# Patient Record
Sex: FEMALE | Race: WHITE | URBAN - METROPOLITAN AREA
[De-identification: names, ages, dates, MRNs, and addresses within clinical notes are randomized per-mention and may not be internally consistent; named-entity substitution may affect disease eponyms.]

---

## 2021-08-09 ENCOUNTER — APPOINTMENT (RX ONLY)
Dept: URBAN - METROPOLITAN AREA CLINIC 93 | Facility: CLINIC | Age: 46
Setting detail: DERMATOLOGY
End: 2021-08-09

## 2021-08-09 DIAGNOSIS — B07.8 OTHER VIRAL WARTS: ICD-10-CM | Status: INADEQUATELY CONTROLLED

## 2021-08-09 PROCEDURE — 17110 DESTRUCTION B9 LES UP TO 14: CPT

## 2021-08-09 PROCEDURE — ? COUNSELING

## 2021-08-09 PROCEDURE — ? LIQUID NITROGEN

## 2021-08-09 PROCEDURE — ? FULL BODY SKIN EXAM - DECLINED

## 2021-08-09 ASSESSMENT — LOCATION DETAILED DESCRIPTION DERM
LOCATION DETAILED: RIGHT LATERAL PLANTAR 3RD TOE
LOCATION DETAILED: RIGHT PLANTAR FOREFOOT OVERLYING 2ND METATARSAL

## 2021-08-09 ASSESSMENT — LOCATION SIMPLE DESCRIPTION DERM
LOCATION SIMPLE: RIGHT PLANTAR SURFACE
LOCATION SIMPLE: PLANTAR SURFACE OF RIGHT 3RD TOE

## 2021-08-09 ASSESSMENT — LOCATION ZONE DERM
LOCATION ZONE: TOE
LOCATION ZONE: FEET

## 2021-08-09 NOTE — PROCEDURE: LIQUID NITROGEN
Render Post-Care Instructions In Note?: no
Show Topical Anesthesia Variable?: Yes
Post-Care Instructions: I reviewed with the patient in detail post-care instructions. Patient is to wear sunprotection, and avoid picking at any of the treated lesions. Pt may apply Vaseline to crusted or scabbing areas.
Number Of Freeze-Thaw Cycles: 3 freeze-thaw cycles
Medical Necessity Clause: This procedure was medically necessary because the lesions that were treated were:
Consent: The patient's consent was obtained including but not limited to risks of crusting, scabbing, blistering, scarring, darker or lighter pigmentary change, recurrence, incomplete removal and infection.
Detail Level: Detailed
Medical Necessity Information: It is in your best interest to select a reason for this procedure from the list below. All of these items fulfill various CMS LCD requirements except the new and changing color options.
Duration Of Freeze Thaw-Cycle (Seconds): 4

## 2021-11-01 ENCOUNTER — APPOINTMENT (RX ONLY)
Dept: URBAN - METROPOLITAN AREA CLINIC 93 | Facility: CLINIC | Age: 46
Setting detail: DERMATOLOGY
End: 2021-11-01

## 2021-11-01 DIAGNOSIS — B07.8 OTHER VIRAL WARTS: ICD-10-CM

## 2021-11-01 PROCEDURE — ? LIQUID NITROGEN

## 2021-11-01 PROCEDURE — 17110 DESTRUCTION B9 LES UP TO 14: CPT

## 2021-11-01 PROCEDURE — ? COUNSELING

## 2021-11-01 PROCEDURE — ? FULL BODY SKIN EXAM - DECLINED

## 2021-11-01 ASSESSMENT — LOCATION SIMPLE DESCRIPTION DERM: LOCATION SIMPLE: RIGHT PLANTAR SURFACE

## 2021-11-01 ASSESSMENT — LOCATION ZONE DERM: LOCATION ZONE: FEET

## 2021-11-01 ASSESSMENT — LOCATION DETAILED DESCRIPTION DERM: LOCATION DETAILED: RIGHT PLANTAR FOREFOOT OVERLYING 2ND METATARSAL

## 2021-11-22 ENCOUNTER — APPOINTMENT (RX ONLY)
Dept: URBAN - METROPOLITAN AREA CLINIC 93 | Facility: CLINIC | Age: 46
Setting detail: DERMATOLOGY
End: 2021-11-22

## 2021-11-22 DIAGNOSIS — B07.8 OTHER VIRAL WARTS: ICD-10-CM

## 2021-11-22 PROCEDURE — 17110 DESTRUCTION B9 LES UP TO 14: CPT

## 2021-11-22 PROCEDURE — ? LIQUID NITROGEN

## 2021-11-22 PROCEDURE — ? COUNSELING

## 2021-11-22 PROCEDURE — ? FULL BODY SKIN EXAM - DECLINED

## 2021-11-22 ASSESSMENT — LOCATION ZONE DERM: LOCATION ZONE: FEET

## 2021-11-22 ASSESSMENT — LOCATION SIMPLE DESCRIPTION DERM: LOCATION SIMPLE: RIGHT PLANTAR SURFACE

## 2021-11-22 ASSESSMENT — LOCATION DETAILED DESCRIPTION DERM: LOCATION DETAILED: RIGHT PLANTAR FOREFOOT OVERLYING 2ND METATARSAL

## 2021-12-13 ENCOUNTER — APPOINTMENT (RX ONLY)
Dept: URBAN - METROPOLITAN AREA CLINIC 93 | Facility: CLINIC | Age: 46
Setting detail: DERMATOLOGY
End: 2021-12-13

## 2021-12-13 DIAGNOSIS — L21.8 OTHER SEBORRHEIC DERMATITIS: ICD-10-CM | Status: INADEQUATELY CONTROLLED

## 2021-12-13 DIAGNOSIS — B07.8 OTHER VIRAL WARTS: ICD-10-CM

## 2021-12-13 DIAGNOSIS — L65.0 TELOGEN EFFLUVIUM: ICD-10-CM

## 2021-12-13 PROCEDURE — 99213 OFFICE O/P EST LOW 20 MIN: CPT | Mod: 25

## 2021-12-13 PROCEDURE — ? COUNSELING

## 2021-12-13 PROCEDURE — ? LIQUID NITROGEN

## 2021-12-13 PROCEDURE — ? FULL BODY SKIN EXAM - DECLINED

## 2021-12-13 PROCEDURE — ? PRESCRIPTION

## 2021-12-13 PROCEDURE — ? ADDITIONAL NOTES

## 2021-12-13 PROCEDURE — 17110 DESTRUCTION B9 LES UP TO 14: CPT

## 2021-12-13 RX ORDER — KETOCONAZOLE 20 MG/G
CREAM TOPICAL BID
Qty: 30 | Refills: 2 | Status: ERX | COMMUNITY
Start: 2021-12-13

## 2021-12-13 RX ORDER — HYDROCORTISONE 25 MG/G
CREAM TOPICAL
Qty: 30 | Refills: 2 | Status: ERX | COMMUNITY
Start: 2021-12-13

## 2021-12-13 RX ADMIN — HYDROCORTISONE: 25 CREAM TOPICAL at 00:00

## 2021-12-13 RX ADMIN — KETOCONAZOLE: 20 CREAM TOPICAL at 00:00

## 2021-12-13 ASSESSMENT — LOCATION DETAILED DESCRIPTION DERM
LOCATION DETAILED: RIGHT PLANTAR FOREFOOT OVERLYING 4TH METATARSAL
LOCATION DETAILED: RIGHT SUPERIOR MEDIAL FOREHEAD
LOCATION DETAILED: RIGHT CYMBA CONCHA
LOCATION DETAILED: RIGHT PLANTAR FOREFOOT OVERLYING 3RD METATARSAL
LOCATION DETAILED: LEFT CYMBA CONCHA

## 2021-12-13 ASSESSMENT — LOCATION SIMPLE DESCRIPTION DERM
LOCATION SIMPLE: LEFT EAR
LOCATION SIMPLE: RIGHT EAR
LOCATION SIMPLE: RIGHT PLANTAR SURFACE
LOCATION SIMPLE: RIGHT FOREHEAD

## 2021-12-13 ASSESSMENT — LOCATION ZONE DERM
LOCATION ZONE: FEET
LOCATION ZONE: EAR
LOCATION ZONE: FACE

## 2021-12-13 NOTE — PROCEDURE: LIQUID NITROGEN
Include Z78.9 (Other Specified Conditions Influencing Health Status) As An Associated Diagnosis?: No
Duration Of Freeze Thaw-Cycle (Seconds): 3
Number Of Freeze-Thaw Cycles: 3 freeze-thaw cycles
Consent: The patient's consent was obtained including but not limited to risks of crusting, scabbing, blistering, scarring, darker or lighter pigmentary change, recurrence, incomplete removal and infection.
Medical Necessity Information: It is in your best interest to select a reason for this procedure from the list below. All of these items fulfill various CMS LCD requirements except the new and changing color options.
Detail Level: Detailed
Show Applicator Variable?: Yes
Post-Care Instructions: I reviewed with the patient in detail post-care instructions. Patient is to wear sunprotection, and avoid picking at any of the treated lesions. Pt may apply Vaseline to crusted or scabbing areas.
Medical Necessity Clause: This procedure was medically necessary because the lesions that were treated were:

## 2021-12-13 NOTE — PROCEDURE: ADDITIONAL NOTES
Render Risk Assessment In Note?: no
Additional Notes: Patient had COVID in July and noticed diffuse hair shedding starting in September of this year.
Detail Level: Simple

## 2022-01-17 ENCOUNTER — APPOINTMENT (RX ONLY)
Dept: URBAN - METROPOLITAN AREA CLINIC 93 | Facility: CLINIC | Age: 47
Setting detail: DERMATOLOGY
End: 2022-01-17

## 2022-01-17 DIAGNOSIS — L21.8 OTHER SEBORRHEIC DERMATITIS: ICD-10-CM | Status: INADEQUATELY CONTROLLED

## 2022-01-17 DIAGNOSIS — L65.0 TELOGEN EFFLUVIUM: ICD-10-CM

## 2022-01-17 DIAGNOSIS — B07.8 OTHER VIRAL WARTS: ICD-10-CM

## 2022-01-17 PROCEDURE — ? FULL BODY SKIN EXAM - DECLINED

## 2022-01-17 PROCEDURE — ? LIQUID NITROGEN

## 2022-01-17 PROCEDURE — ? COUNSELING

## 2022-01-17 PROCEDURE — 17110 DESTRUCTION B9 LES UP TO 14: CPT

## 2022-01-17 PROCEDURE — 99214 OFFICE O/P EST MOD 30 MIN: CPT | Mod: 25

## 2022-01-17 PROCEDURE — ? PRESCRIPTION

## 2022-01-17 PROCEDURE — ? ADDITIONAL NOTES

## 2022-01-17 RX ORDER — IRON/C/FOLIC ACD/MV CMB11/CALC 151-200-1
TABLET ORAL QD
Qty: 30 | Refills: 4 | Status: ERX | COMMUNITY
Start: 2022-01-17

## 2022-01-17 RX ORDER — HYDROCORTISONE 25 MG/G
CREAM TOPICAL
Qty: 30 | Refills: 2 | Status: ERX

## 2022-01-17 RX ADMIN — Medication: at 00:00

## 2022-01-17 ASSESSMENT — LOCATION ZONE DERM
LOCATION ZONE: FEET
LOCATION ZONE: FACE
LOCATION ZONE: EAR

## 2022-01-17 ASSESSMENT — LOCATION DETAILED DESCRIPTION DERM
LOCATION DETAILED: LEFT CYMBA CONCHA
LOCATION DETAILED: RIGHT SUPERIOR MEDIAL FOREHEAD
LOCATION DETAILED: RIGHT CYMBA CONCHA
LOCATION DETAILED: RIGHT PLANTAR FOREFOOT OVERLYING 4TH METATARSAL
LOCATION DETAILED: RIGHT PLANTAR FOREFOOT OVERLYING 3RD METATARSAL

## 2022-01-17 ASSESSMENT — LOCATION SIMPLE DESCRIPTION DERM
LOCATION SIMPLE: LEFT EAR
LOCATION SIMPLE: RIGHT PLANTAR SURFACE
LOCATION SIMPLE: RIGHT EAR
LOCATION SIMPLE: RIGHT FOREHEAD

## 2022-01-17 NOTE — PROCEDURE: ADDITIONAL NOTES
Render Risk Assessment In Note?: no
Additional Notes: Patient had COVID in July and noticed diffuse hair shedding starting in September of this year. Labs were reviewed from her PCP and her  ferritin levels were low
Detail Level: Simple

## 2022-02-07 ENCOUNTER — APPOINTMENT (RX ONLY)
Dept: URBAN - METROPOLITAN AREA CLINIC 93 | Facility: CLINIC | Age: 47
Setting detail: DERMATOLOGY
End: 2022-02-07

## 2022-02-07 DIAGNOSIS — Z41.9 ENCOUNTER FOR PROCEDURE FOR PURPOSES OTHER THAN REMEDYING HEALTH STATE, UNSPECIFIED: ICD-10-CM

## 2022-02-07 DIAGNOSIS — B07.8 OTHER VIRAL WARTS: ICD-10-CM

## 2022-02-07 PROCEDURE — ? COUNSELING

## 2022-02-07 PROCEDURE — ? FULL BODY SKIN EXAM - DECLINED

## 2022-02-07 PROCEDURE — ? COSMETIC CONSULTATION: BOTOX

## 2022-02-07 PROCEDURE — ? ADDITIONAL NOTES

## 2022-02-07 PROCEDURE — ? LIQUID NITROGEN

## 2022-02-07 PROCEDURE — 17110 DESTRUCTION B9 LES UP TO 14: CPT

## 2022-02-07 ASSESSMENT — LOCATION SIMPLE DESCRIPTION DERM: LOCATION SIMPLE: RIGHT PLANTAR SURFACE

## 2022-02-07 ASSESSMENT — LOCATION DETAILED DESCRIPTION DERM
LOCATION DETAILED: RIGHT PLANTAR FOREFOOT OVERLYING 3RD METATARSAL
LOCATION DETAILED: RIGHT PLANTAR FOREFOOT OVERLYING 4TH METATARSAL

## 2022-02-07 ASSESSMENT — LOCATION ZONE DERM: LOCATION ZONE: FEET

## 2022-02-07 NOTE — PROCEDURE: ADDITIONAL NOTES
Detail Level: Simple
Additional Notes: Pt was quoted $156 for 12 units periocular
Render Risk Assessment In Note?: no

## 2022-03-10 ENCOUNTER — APPOINTMENT (RX ONLY)
Dept: URBAN - METROPOLITAN AREA CLINIC 93 | Facility: CLINIC | Age: 47
Setting detail: DERMATOLOGY
End: 2022-03-10

## 2022-03-10 DIAGNOSIS — Z41.9 ENCOUNTER FOR PROCEDURE FOR PURPOSES OTHER THAN REMEDYING HEALTH STATE, UNSPECIFIED: ICD-10-CM

## 2022-03-10 DIAGNOSIS — B07.8 OTHER VIRAL WARTS: ICD-10-CM

## 2022-03-10 PROCEDURE — ? COSMETIC CONSULTATION: FILLERS

## 2022-03-10 PROCEDURE — 17110 DESTRUCTION B9 LES UP TO 14: CPT

## 2022-03-10 PROCEDURE — ? LIQUID NITROGEN

## 2022-03-10 PROCEDURE — ? ADDITIONAL NOTES

## 2022-03-10 PROCEDURE — ? COSMETIC CONSULTATION: BOTOX

## 2022-03-10 PROCEDURE — ? COUNSELING

## 2022-03-10 PROCEDURE — ? FULL BODY SKIN EXAM - DECLINED

## 2022-03-10 ASSESSMENT — LOCATION DETAILED DESCRIPTION DERM
LOCATION DETAILED: RIGHT SUPERIOR MEDIAL BUCCAL CHEEK
LOCATION DETAILED: LEFT SUPERIOR CENTRAL BUCCAL CHEEK
LOCATION DETAILED: RIGHT PLANTAR FOREFOOT OVERLYING 1ST METATARSAL

## 2022-03-10 ASSESSMENT — LOCATION SIMPLE DESCRIPTION DERM
LOCATION SIMPLE: RIGHT PLANTAR SURFACE
LOCATION SIMPLE: RIGHT CHEEK
LOCATION SIMPLE: LEFT CHEEK

## 2022-03-10 ASSESSMENT — LOCATION ZONE DERM
LOCATION ZONE: FACE
LOCATION ZONE: FEET

## 2022-03-10 NOTE — PROCEDURE: ADDITIONAL NOTES
Additional Notes: Patient was counseled to have a consultation with Dr. Meza for [place]
Detail Level: Simple
Render Risk Assessment In Note?: no

## 2022-04-28 ENCOUNTER — APPOINTMENT (RX ONLY)
Dept: URBAN - METROPOLITAN AREA CLINIC 93 | Facility: CLINIC | Age: 47
Setting detail: DERMATOLOGY
End: 2022-04-28

## 2022-04-28 DIAGNOSIS — B07.8 OTHER VIRAL WARTS: ICD-10-CM

## 2022-04-28 PROCEDURE — ? LIQUID NITROGEN

## 2022-04-28 PROCEDURE — 17110 DESTRUCTION B9 LES UP TO 14: CPT

## 2022-04-28 PROCEDURE — ? FULL BODY SKIN EXAM - DECLINED

## 2022-04-28 PROCEDURE — ? COUNSELING

## 2022-04-28 ASSESSMENT — LOCATION ZONE DERM: LOCATION ZONE: FEET

## 2022-04-28 ASSESSMENT — LOCATION DETAILED DESCRIPTION DERM: LOCATION DETAILED: RIGHT PLANTAR FOREFOOT OVERLYING 1ST METATARSAL

## 2022-04-28 ASSESSMENT — LOCATION SIMPLE DESCRIPTION DERM: LOCATION SIMPLE: RIGHT PLANTAR SURFACE

## 2022-06-09 ENCOUNTER — APPOINTMENT (RX ONLY)
Dept: URBAN - METROPOLITAN AREA CLINIC 166 | Facility: CLINIC | Age: 47
Setting detail: DERMATOLOGY
End: 2022-06-09

## 2022-06-09 DIAGNOSIS — B07.8 OTHER VIRAL WARTS: ICD-10-CM

## 2022-06-09 DIAGNOSIS — D22 MELANOCYTIC NEVI: ICD-10-CM

## 2022-06-09 DIAGNOSIS — Z12.83 ENCOUNTER FOR SCREENING FOR MALIGNANT NEOPLASM OF SKIN: ICD-10-CM

## 2022-06-09 PROBLEM — D22.9 MELANOCYTIC NEVI, UNSPECIFIED: Status: ACTIVE | Noted: 2022-06-09

## 2022-06-09 PROCEDURE — ? COUNSELING

## 2022-06-09 PROCEDURE — ? BENIGN DESTRUCTION

## 2022-06-09 PROCEDURE — ? FULL BODY SKIN EXAM - DECLINED

## 2022-06-09 PROCEDURE — 99212 OFFICE O/P EST SF 10 MIN: CPT | Mod: 25

## 2022-06-09 PROCEDURE — 17110 DESTRUCTION B9 LES UP TO 14: CPT

## 2022-06-09 ASSESSMENT — LOCATION ZONE DERM: LOCATION ZONE: FEET

## 2022-06-09 ASSESSMENT — LOCATION SIMPLE DESCRIPTION DERM: LOCATION SIMPLE: LEFT PLANTAR SURFACE

## 2022-06-09 ASSESSMENT — TOTAL NUMBER OF VERRUCA VULGARIS: # OF LESIONS?: 1

## 2022-06-09 ASSESSMENT — LOCATION DETAILED DESCRIPTION DERM: LOCATION DETAILED: LEFT PLANTAR FOREFOOT OVERLYING 2ND METATARSAL

## 2022-06-09 NOTE — PROCEDURE: BENIGN DESTRUCTION
Include Z78.9 (Other Specified Conditions Influencing Health Status) As An Associated Diagnosis?: No
Consent: The patient's consent was obtained including but not limited to risks of crusting, scabbing, blistering, scarring, darker or lighter pigmentary change, recurrence, incomplete removal and infection.
Medical Necessity Clause: This procedure was medically necessary because the lesions that were treated were:
Anesthesia Volume In Cc: 1.5
Medical Necessity Information: It is in your best interest to select a reason for this procedure from the list below. All of these items fulfill various CMS LCD requirements except the new and changing color options.
Post-Care Instructions: I reviewed with the patient in detail post-care instructions. Patient is to wear sunprotection, and avoid picking at any of the treated lesions. Pt may apply Vaseline to crusted or scabbing areas.
Treatment Number (Will Not Render If 0): 0
Detail Level: Detailed

## 2023-02-21 ENCOUNTER — APPOINTMENT (RX ONLY)
Dept: URBAN - METROPOLITAN AREA CLINIC 93 | Facility: CLINIC | Age: 48
Setting detail: DERMATOLOGY
End: 2023-02-21

## 2023-02-21 DIAGNOSIS — L72.0 EPIDERMAL CYST: ICD-10-CM

## 2023-02-21 DIAGNOSIS — L73.8 OTHER SPECIFIED FOLLICULAR DISORDERS: ICD-10-CM

## 2023-02-21 PROCEDURE — ? PRESCRIPTION MEDICATION MANAGEMENT

## 2023-02-21 PROCEDURE — 99213 OFFICE O/P EST LOW 20 MIN: CPT

## 2023-02-21 PROCEDURE — ? COUNSELING

## 2023-02-21 PROCEDURE — ? PRESCRIPTION

## 2023-02-21 RX ORDER — TRETIONIN 0.5 MG/G
CREAM TOPICAL QHS
Qty: 20 | Refills: 5 | Status: ERX | COMMUNITY
Start: 2023-02-21

## 2023-02-21 RX ADMIN — TRETIONIN: 0.5 CREAM TOPICAL at 00:00

## 2023-02-21 ASSESSMENT — LOCATION DETAILED DESCRIPTION DERM
LOCATION DETAILED: LEFT MEDIAL CANTHUS
LOCATION DETAILED: RIGHT MEDIAL MALAR CHEEK

## 2023-02-21 ASSESSMENT — LOCATION SIMPLE DESCRIPTION DERM
LOCATION SIMPLE: RIGHT CHEEK
LOCATION SIMPLE: LEFT EYELID

## 2023-02-21 ASSESSMENT — LOCATION ZONE DERM
LOCATION ZONE: EYELID
LOCATION ZONE: FACE

## 2023-02-21 NOTE — PROCEDURE: PRESCRIPTION MEDICATION MANAGEMENT
Plan: Referred to [facility] office as pt desires Plan: Referred to [facility] office as pt desires

## 2023-11-13 ENCOUNTER — APPOINTMENT (RX ONLY)
Dept: URBAN - METROPOLITAN AREA CLINIC 166 | Facility: CLINIC | Age: 48
Setting detail: DERMATOLOGY
End: 2023-11-13

## 2023-11-13 DIAGNOSIS — L73.8 OTHER SPECIFIED FOLLICULAR DISORDERS: ICD-10-CM | Status: INADEQUATELY CONTROLLED

## 2023-11-13 PROCEDURE — ? RECOMMENDATIONS

## 2023-11-13 PROCEDURE — ? COUNSELING

## 2023-11-13 PROCEDURE — 99212 OFFICE O/P EST SF 10 MIN: CPT

## 2023-11-13 PROCEDURE — ? FULL BODY SKIN EXAM - DECLINED

## 2023-11-13 ASSESSMENT — LOCATION DETAILED DESCRIPTION DERM: LOCATION DETAILED: RIGHT MEDIAL MALAR CHEEK

## 2023-11-13 ASSESSMENT — LOCATION SIMPLE DESCRIPTION DERM: LOCATION SIMPLE: RIGHT CHEEK

## 2023-11-13 ASSESSMENT — LOCATION ZONE DERM: LOCATION ZONE: FACE

## 2023-11-13 NOTE — PROCEDURE: RECOMMENDATIONS
Detail Level: Zone
Render Risk Assessment In Note?: no
Recommendation Preamble: The following recommendations were made during visit: Dr. Hester in celebration for further evaluation and management.

## 2024-11-11 ENCOUNTER — APPOINTMENT (RX ONLY)
Dept: URBAN - METROPOLITAN AREA CLINIC 166 | Facility: CLINIC | Age: 49
Setting detail: DERMATOLOGY
End: 2024-11-11

## 2024-11-11 DIAGNOSIS — L21.8 OTHER SEBORRHEIC DERMATITIS: ICD-10-CM

## 2024-11-11 PROCEDURE — 99213 OFFICE O/P EST LOW 20 MIN: CPT

## 2024-11-11 PROCEDURE — ? COUNSELING

## 2024-11-11 PROCEDURE — ? PRESCRIPTION

## 2024-11-11 PROCEDURE — ? FULL BODY SKIN EXAM - DECLINED

## 2024-11-11 RX ORDER — KETOCONAZOLE 20 MG/ML
SHAMPOO, SUSPENSION TOPICAL
Qty: 120 | Refills: 2 | Status: ERX | COMMUNITY
Start: 2024-11-11

## 2024-11-11 RX ORDER — HYDROCORTISONE 25 MG/G
CREAM TOPICAL
Qty: 30 | Refills: 2 | Status: ERX | COMMUNITY
Start: 2024-11-11

## 2024-11-11 RX ORDER — KETOCONAZOLE 20 MG/G
CREAM TOPICAL
Qty: 60 | Refills: 3 | Status: ERX | COMMUNITY
Start: 2024-11-11

## 2024-11-11 RX ADMIN — KETOCONAZOLE: 20 CREAM TOPICAL at 00:00

## 2024-11-11 RX ADMIN — HYDROCORTISONE: 25 CREAM TOPICAL at 00:00

## 2024-11-11 RX ADMIN — KETOCONAZOLE: 20 SHAMPOO, SUSPENSION TOPICAL at 00:00

## 2024-11-11 ASSESSMENT — LOCATION ZONE DERM: LOCATION ZONE: EAR

## 2024-11-11 ASSESSMENT — LOCATION DETAILED DESCRIPTION DERM
LOCATION DETAILED: LEFT CYMBA CONCHA
LOCATION DETAILED: RIGHT CYMBA CONCHA

## 2024-11-11 ASSESSMENT — SEVERITY ASSESSMENT: HOW SEVERE IS THIS PATIENT'S CONDITION?: MILD

## 2024-11-11 ASSESSMENT — LOCATION SIMPLE DESCRIPTION DERM
LOCATION SIMPLE: RIGHT EAR
LOCATION SIMPLE: LEFT EAR

## 2024-12-19 ENCOUNTER — APPOINTMENT (OUTPATIENT)
Dept: URBAN - METROPOLITAN AREA CLINIC 166 | Facility: CLINIC | Age: 49
Setting detail: DERMATOLOGY
End: 2024-12-19

## 2024-12-19 DIAGNOSIS — L91.0 HYPERTROPHIC SCAR: ICD-10-CM

## 2024-12-19 PROCEDURE — ? FULL BODY SKIN EXAM - DECLINED

## 2024-12-19 PROCEDURE — ? ADDITIONAL NOTES

## 2024-12-19 PROCEDURE — ? COUNSELING

## 2024-12-19 PROCEDURE — 99212 OFFICE O/P EST SF 10 MIN: CPT

## 2024-12-19 ASSESSMENT — LOCATION SIMPLE DESCRIPTION DERM: LOCATION SIMPLE: NECK

## 2024-12-19 ASSESSMENT — LOCATION ZONE DERM: LOCATION ZONE: NECK

## 2024-12-19 ASSESSMENT — SCAR ASSESSEMENT OVERALL: SCAR ASSESSMENT: 2.5 (RAISED UNIFORM SCAR, ERYTHEMA)

## 2024-12-19 ASSESSMENT — LOCATION DETAILED DESCRIPTION DERM: LOCATION DETAILED: LEFT SUPERIOR LATERAL NECK

## 2024-12-19 NOTE — PROCEDURE: ADDITIONAL NOTES
Detail Level: Detailed
Additional Notes: No treatment needed at this time
Render Risk Assessment In Note?: no

## 2024-12-19 NOTE — HPI: SCAR (COMPLEX), NECK
How Severe Is The Scar?: moderate
[id number]: scar_concerns_texture
Is This A New Presentation, Or A Follow-Up?: Neck Scar
Date Of Surgery: 3/11/2024
Name Of Surgery: Laser treatment

## 2025-08-12 ENCOUNTER — APPOINTMENT (OUTPATIENT)
Dept: URBAN - METROPOLITAN AREA CLINIC 166 | Facility: CLINIC | Age: 50
Setting detail: DERMATOLOGY
End: 2025-08-12

## 2025-08-12 DIAGNOSIS — L57.8 OTHER SKIN CHANGES DUE TO CHRONIC EXPOSURE TO NONIONIZING RADIATION: ICD-10-CM

## 2025-08-12 DIAGNOSIS — D22 MELANOCYTIC NEVI: ICD-10-CM

## 2025-08-12 DIAGNOSIS — L70.0 ACNE VULGARIS: ICD-10-CM

## 2025-08-12 DIAGNOSIS — L82.1 OTHER SEBORRHEIC KERATOSIS: ICD-10-CM

## 2025-08-12 DIAGNOSIS — L81.4 OTHER MELANIN HYPERPIGMENTATION: ICD-10-CM

## 2025-08-12 PROBLEM — D22.4 MELANOCYTIC NEVI OF SCALP AND NECK: Status: ACTIVE | Noted: 2025-08-12

## 2025-08-12 PROBLEM — D22.39 MELANOCYTIC NEVI OF OTHER PARTS OF FACE: Status: ACTIVE | Noted: 2025-08-12

## 2025-08-12 PROBLEM — D22.61 MELANOCYTIC NEVI OF RIGHT UPPER LIMB, INCLUDING SHOULDER: Status: ACTIVE | Noted: 2025-08-12

## 2025-08-12 PROBLEM — D22.62 MELANOCYTIC NEVI OF LEFT UPPER LIMB, INCLUDING SHOULDER: Status: ACTIVE | Noted: 2025-08-12

## 2025-08-12 PROBLEM — D22.5 MELANOCYTIC NEVI OF TRUNK: Status: ACTIVE | Noted: 2025-08-12

## 2025-08-12 PROCEDURE — ? TREATMENT REGIMEN

## 2025-08-12 PROCEDURE — ? COUNSELING

## 2025-08-12 PROCEDURE — ? FULL BODY SKIN EXAM - DECLINED

## 2025-08-12 PROCEDURE — ? PRESCRIPTION

## 2025-08-12 RX ORDER — TRETIONIN 1 MG/G
CREAM TOPICAL
Qty: 45 | Refills: 2 | Status: ERX | COMMUNITY
Start: 2025-08-12

## 2025-08-12 RX ORDER — CLINDAMYCIN PHOSPHATE 10 MG/ML
SOLUTION TOPICAL QAM
Qty: 60 | Refills: 2 | Status: ERX | COMMUNITY
Start: 2025-08-12

## 2025-08-12 RX ADMIN — TRETIONIN: 1 CREAM TOPICAL at 00:00

## 2025-08-12 RX ADMIN — CLINDAMYCIN PHOSPHATE: 10 SOLUTION TOPICAL at 00:00

## 2025-08-12 ASSESSMENT — LOCATION ZONE DERM
LOCATION ZONE: TRUNK
LOCATION ZONE: FACE
LOCATION ZONE: ARM
LOCATION ZONE: NECK
LOCATION ZONE: SCALP

## 2025-08-12 ASSESSMENT — LOCATION SIMPLE DESCRIPTION DERM
LOCATION SIMPLE: INFERIOR FOREHEAD
LOCATION SIMPLE: LEFT UPPER BACK
LOCATION SIMPLE: RIGHT CHEEK
LOCATION SIMPLE: RIGHT SCALP
LOCATION SIMPLE: RIGHT POSTERIOR UPPER ARM
LOCATION SIMPLE: LEFT ANTERIOR NECK
LOCATION SIMPLE: SCALP
LOCATION SIMPLE: CHEST
LOCATION SIMPLE: LEFT CHEEK
LOCATION SIMPLE: SUPERIOR FOREHEAD
LOCATION SIMPLE: POSTERIOR NECK
LOCATION SIMPLE: UPPER BACK
LOCATION SIMPLE: LEFT POSTERIOR UPPER ARM

## 2025-08-12 ASSESSMENT — LOCATION DETAILED DESCRIPTION DERM
LOCATION DETAILED: LEFT PROXIMAL POSTERIOR UPPER ARM
LOCATION DETAILED: MIDDLE STERNUM
LOCATION DETAILED: LEFT DISTAL POSTERIOR UPPER ARM
LOCATION DETAILED: RIGHT CENTRAL MALAR CHEEK
LOCATION DETAILED: INFERIOR MID FOREHEAD
LOCATION DETAILED: RIGHT SUPERIOR PARIETAL SCALP
LOCATION DETAILED: RIGHT PROXIMAL POSTERIOR UPPER ARM
LOCATION DETAILED: RIGHT MEDIAL FRONTAL SCALP
LOCATION DETAILED: LEFT INFERIOR POSTERIOR NECK
LOCATION DETAILED: RIGHT DISTAL POSTERIOR UPPER ARM
LOCATION DETAILED: UPPER STERNUM
LOCATION DETAILED: SUPERIOR MID FOREHEAD
LOCATION DETAILED: MID POSTERIOR NECK
LOCATION DETAILED: RIGHT INFERIOR CENTRAL MALAR CHEEK
LOCATION DETAILED: LEFT INFERIOR CENTRAL MALAR CHEEK
LOCATION DETAILED: LEFT SUPERIOR MEDIAL UPPER BACK
LOCATION DETAILED: LEFT INFERIOR ANTERIOR NECK
LOCATION DETAILED: SUPERIOR THORACIC SPINE

## 2025-08-12 ASSESSMENT — PAIN INTENSITY VAS: HOW INTENSE IS YOUR PAIN 0 BEING NO PAIN, 10 BEING THE MOST SEVERE PAIN POSSIBLE?: NO PAIN

## 2025-08-12 ASSESSMENT — SEVERITY ASSESSMENT OVERALL AMONG ALL PATIENTS
IN YOUR EXPERIENCE, AMONG ALL PATIENTS YOU HAVE SEEN WITH THIS CONDITION, HOW SEVERE IS THIS PATIENT'S CONDITION?: FEW INFLAMMATORY LESIONS, SOME NONINFLAMMATORY